# Patient Record
Sex: MALE | Race: WHITE | NOT HISPANIC OR LATINO | URBAN - METROPOLITAN AREA
[De-identification: names, ages, dates, MRNs, and addresses within clinical notes are randomized per-mention and may not be internally consistent; named-entity substitution may affect disease eponyms.]

---

## 2018-06-01 ENCOUNTER — OFFICE VISIT - HEALTHEAST (OUTPATIENT)
Dept: CARDIOLOGY | Facility: CLINIC | Age: 68
End: 2018-06-01

## 2018-06-01 DIAGNOSIS — R00.1 BRADYCARDIA: ICD-10-CM

## 2018-06-01 DIAGNOSIS — R55 VASOVAGAL SYNCOPE: ICD-10-CM

## 2018-06-01 DIAGNOSIS — I95.1 ORTHOSTATIC HYPOTENSION: ICD-10-CM

## 2018-06-01 ASSESSMENT — MIFFLIN-ST. JEOR: SCORE: 1707.93

## 2019-09-30 ENCOUNTER — OFFICE VISIT - HEALTHEAST (OUTPATIENT)
Dept: CARDIOLOGY | Facility: CLINIC | Age: 69
End: 2019-09-30

## 2019-09-30 DIAGNOSIS — I10 ESSENTIAL (PRIMARY) HYPERTENSION: ICD-10-CM

## 2019-09-30 DIAGNOSIS — R06.09 DYSPNEA ON EXERTION: ICD-10-CM

## 2019-09-30 DIAGNOSIS — I49.5 SINUS NODE DYSFUNCTION (H): ICD-10-CM

## 2019-09-30 ASSESSMENT — MIFFLIN-ST. JEOR: SCORE: 1707.93

## 2019-10-14 ENCOUNTER — HOSPITAL ENCOUNTER (OUTPATIENT)
Dept: CARDIOLOGY | Facility: CLINIC | Age: 69
Discharge: HOME | End: 2019-10-14
Attending: INTERNAL MEDICINE

## 2019-10-14 DIAGNOSIS — I49.5 SINUS NODE DYSFUNCTION (H): ICD-10-CM

## 2019-10-14 DIAGNOSIS — R06.09 OTHER FORMS OF DYSPNEA: ICD-10-CM

## 2019-10-14 DIAGNOSIS — R06.09 DYSPNEA ON EXERTION: ICD-10-CM

## 2019-10-14 LAB
CV STRESS CURRENT BP HE: NORMAL
CV STRESS CURRENT HR HE: 100
CV STRESS CURRENT HR HE: 102
CV STRESS CURRENT HR HE: 104
CV STRESS CURRENT HR HE: 107
CV STRESS CURRENT HR HE: 108
CV STRESS CURRENT HR HE: 108
CV STRESS CURRENT HR HE: 109
CV STRESS CURRENT HR HE: 115
CV STRESS CURRENT HR HE: 120
CV STRESS CURRENT HR HE: 122
CV STRESS CURRENT HR HE: 124
CV STRESS CURRENT HR HE: 125
CV STRESS CURRENT HR HE: 129
CV STRESS CURRENT HR HE: 131
CV STRESS CURRENT HR HE: 131
CV STRESS CURRENT HR HE: 53
CV STRESS CURRENT HR HE: 54
CV STRESS CURRENT HR HE: 63
CV STRESS CURRENT HR HE: 69
CV STRESS CURRENT HR HE: 74
CV STRESS CURRENT HR HE: 74
CV STRESS CURRENT HR HE: 75
CV STRESS CURRENT HR HE: 78
CV STRESS CURRENT HR HE: 79
CV STRESS CURRENT HR HE: 80
CV STRESS CURRENT HR HE: 80
CV STRESS CURRENT HR HE: 81
CV STRESS CURRENT HR HE: 82
CV STRESS CURRENT HR HE: 83
CV STRESS CURRENT HR HE: 83
CV STRESS CURRENT HR HE: 93
CV STRESS CURRENT HR HE: 98
CV STRESS DEVIATION TIME HE: NORMAL
CV STRESS ECHO PERCENT HR HE: NORMAL
CV STRESS EXERCISE STAGE HE: NORMAL
CV STRESS FINAL RESTING BP HE: NORMAL
CV STRESS FINAL RESTING HR HE: 78
CV STRESS MAX HR HE: 131
CV STRESS MAX TREADMILL GRADE HE: 18
CV STRESS MAX TREADMILL SPEED HE: 5
CV STRESS PEAK DIA BP HE: NORMAL
CV STRESS PEAK SYS BP HE: NORMAL
CV STRESS PHASE HE: NORMAL
CV STRESS PROTOCOL HE: NORMAL
CV STRESS RESTING PT POSITION HE: NORMAL
CV STRESS RESTING PT POSITION HE: NORMAL
CV STRESS ST DEVIATION AMOUNT HE: NORMAL
CV STRESS ST DEVIATION ELEVATION HE: NORMAL
CV STRESS ST EVELATION AMOUNT HE: NORMAL
CV STRESS TEST TYPE HE: NORMAL
CV STRESS TOTAL STAGE TIME MIN 1 HE: NORMAL
ECHO EJECTION FRACTION ESTIMATED: 65 %
RATE PRESSURE PRODUCT: NORMAL
STRESS ECHO BASELINE BP: NORMAL
STRESS ECHO BASELINE DIASTOLIC HE: 92
STRESS ECHO BASELINE HR: 54
STRESS ECHO BASELINE SYSTOLIC BP: 143
STRESS ECHO CALCULATED PERCENT HR: 86 %
STRESS ECHO LAST STRESS BP: NORMAL
STRESS ECHO LAST STRESS DIASTOLIC BP: 62
STRESS ECHO LAST STRESS HR: 129
STRESS ECHO LAST STRESS SYSTOLIC BP: 184
STRESS ECHO POST ESTIMATED WORKLOAD: 12.3
STRESS ECHO POST EXERCISE DUR MIN: 12
STRESS ECHO POST EXERCISE DUR SEC: 0
STRESS ECHO TARGET HR: 153

## 2021-06-01 ENCOUNTER — RECORDS - HEALTHEAST (OUTPATIENT)
Dept: ADMINISTRATIVE | Facility: CLINIC | Age: 71
End: 2021-06-01

## 2021-06-01 VITALS — HEIGHT: 71 IN | WEIGHT: 203 LBS | BODY MASS INDEX: 28.42 KG/M2

## 2021-06-01 NOTE — PATIENT INSTRUCTIONS - HE
Below is a list of instructions we discussed today in clinic:   1. Schedule a stress test to evaluate your shortness of breath with exertion.  2. If you have sleep apnea then treating it may improve your symptoms.  3. Follow up if the stress test results are abnormal.    You should receive a phone call from this office informing you of test or procedure results within 3 business days of the test being performed.  If you do not hear from our office with the test results within 1 week please do not hesitate to call asking for these results.     It was a pleasure to meet with you today in clinic.  Please do not hesitate to call the [facility] clinic with any questions or concerns at [phone].    Sincerely,     Tim Lerma MD  Non-invasive Cardiology  [facility]

## 2021-06-03 VITALS
DIASTOLIC BLOOD PRESSURE: 76 MMHG | HEIGHT: 71 IN | WEIGHT: 203 LBS | BODY MASS INDEX: 28.42 KG/M2 | HEART RATE: 44 BPM | RESPIRATION RATE: 16 BRPM | SYSTOLIC BLOOD PRESSURE: 138 MMHG

## 2021-06-16 PROBLEM — R55 VASOVAGAL SYNCOPE: Status: ACTIVE | Noted: 2018-06-01

## 2021-06-18 NOTE — PROGRESS NOTES
CARDIOLOGY CLINIC CONSULT NOTE     Assessment/Plan:   1. Vasovagal syncope, aggravated by dehydration from the heat and from diarrhea.  Also aggravated by chronic resting bradycardia.  Maneuvers to prevent syncope and injury were discussed with patient and his wife.  2. Sinus bradycardia, marked.  No definite indications for pacemaker placement.  We will check a 24 hour monitor to assess the degree of bradycardia present and ensure normal response to daily activities.  3. Possible sleep disordered breathing.  Encouraged him to discuss with his primary care provider whether to pursue sleep apnea testing  4. Possible white coat hypertension.  Resting hypotension could contribute to his vulnerability to vasovagal syncope.  Will decrease antihypertensive regimen by eliminating amlodipine, and have him monitor his blood pressures at home for further evaluation    Follow up dependent on results of testing     History of Present Illness:     It is my pleasure to see Kole Aguirre at the [facility] RAPID ACCESS clinic for evaluation of syncopal episode.    Kole Aguirre is a 68 y.o. male with a past medical history of diabetes mellitus type 2, hypertension, hyperlipidemia, obesity, and persistent sinus bradycardia with normal response to activities noted on stress testing 3 years ago.  He is continued to have bradycardia and is known to be somewhat prone to fainting such as with blood draws.  In general he is felt well.  He is on blood pressure medications though he is convinced that his blood pressure is always higher when he goes to the doctor's office.  He has a blood pressure cuff at home but does not check it there.  A bicycle stress echo was done at [facility] 3 months ago.  His heart rate response to exercise was felt to be inadequate, he was given atropine to reach the target heart rate.  No Suggestion of ischemia was noted    Yesterday he was working outside in the heat. he was trying to stay  hydrated but had some GI upset and had 6 or 8 bouts of diarrhea.  He also felt decreased appetite and had poor oral intake.  Several hours later he was at a restaurant with his children and became dizzy diaphoretic nauseated.  He noticed his vision was blurred and he lost consciousness for about 15 seconds.  He had no chest pain or awareness of palpitations.  There was no injury.  His last syncopal episode was 3 years ago.    He is here with his wife today they are most concerned because his son is getting  tomorrow.    Past Medical History:     Patient Active Problem List   Diagnosis     Diabetes     HLD (hyperlipidemia)     Routine general medical examination at a health care facility     Disordered sleep     Essential (primary) hypertension     Hypotension     Bradycardia       Past Surgical History:     Past Surgical History:   Procedure Laterality Date     COLONOSCOPY         Family History:     Family History   Problem Relation Age of Onset     Hypertension Mother      Diabetes Father      Heart attack Brother      Coronary artery disease Brother      Family history reviewed and is not pertinent to the chief complaint or presenting problem    Social History:    reports that he has never smoked. He does not have any smokeless tobacco history on file. He reports that he drinks about 5.0 - 6.0 oz of alcohol per week     Exercise: Active.  Former runner, now mostly doing yard work    Sleep: Generally restorative.  Slow to wake up.  Falls asleep easily during the daytime.  Snores heroically, though no apnea has been noted    Meds:     Current Outpatient Prescriptions on File Prior to Visit   Medication Sig Dispense Refill     amLODIPine (NORVASC) 5 MG tablet Take 5 mg by mouth daily.       aspirin 81 MG EC tablet Take 81 mg by mouth daily.       atorvastatin (LIPITOR) 20 MG tablet Take 20 mg by mouth bedtime.        ibuprofen (ADVIL,MOTRIN) 200 MG tablet Take 400 mg by mouth every 6 (six) hours as needed for  "pain or headaches.       lisinopril (PRINIVIL,ZESTRIL) 10 MG tablet Take 10 mg by mouth daily.       metFORMIN (GLUCOPHAGE-XR) 500 MG 24 hr tablet Take 500 mg by mouth 2 (two) times a day.       Current Facility-Administered Medications on File Prior to Visit   Medication Dose Route Frequency Provider Last Rate Last Dose     [COMPLETED] sodium chloride 0.9% 1,000 mL  1,000 mL Intravenous Once Kristina Chaney MD   Stopped at 05/31/18 6970       Allergies:   Review of patient's allergies indicates no known allergies.    Review of Systems:     General: WNL  Eyes: WNL  Ears/Nose/Throat: WNL  Lungs: Shortness of Breath  Heart: WNL  Stomach: WNL  Bladder: WNL  Muscle/Joints: WNL  Skin: WNL  Nervous System: WNL  Mental Health: WNL     Blood: WNL        Objective:      Physical Exam  203 lb (92.1 kg)  5' 11\" (1.803 m)  Body mass index is 28.31 kg/(m^2).  /60 (Patient Site: Left Arm, Patient Position: Sitting, Cuff Size: Adult Regular)  Pulse (!) 47  Resp 10  Ht 5' 11\" (1.803 m)  Wt 203 lb (92.1 kg)  BMI 28.31 kg/m2    General Appearance : Awake, Alert, No acute distress  HEENT: No Scleral icterus; the mucous membranes were pink and moist.  Conjunctivae injected.  Periorbital puffiness is noted  Neck:  No cervical bruits, jugular venous distention, or thyromegaly    Chest: The spine was straight  Lungs: Respirations unlabored; the lungs are clear to auscultation.  No wheezing    Cardiovascular:    Normal point of maximal impulse.  Auscultation reveals slow first and second heart sounds with no murmurs, rubs, or gallops.  Carotid, radial, and dorsalis pedal pulses and intact.    Abdomen: No organomegaly, masses, bruits, or tenderness. Bowels sounds are present  Extremities: No edema  Skin: No xanthelasma. Warm, Dry.  Musculoskeletal: No tenderness.  Neurologic: Alert and oriented ×3.      EKG(personally reviewed):  5/31/2018: Sinus bradycardia 44 bpm.  Incomplete right bundle branch block.  No change versus " 2015.    Cardiac Imaging Studies:  HOLTER MONITOR REPORT   IMPRESSION:   1. A 24-hour Holter monitor was applied 07/16/2015 with date of analysis/interpretation 07/20/2015.   2. Sinus bradycardia is present. The average heart rate is 51 beats per minute.   Nocturnal rates as low as 33 beats per minute are noted and maximum heart rate achieved is 117.   3. With activity there appears to be an episode of junctional rhythm at 99 beats   per minute. This occurs while walking and was associated with the sensation of  tightness in chest.   4. No paroxysmal bradycardia or high grade heart block.   5. Rare ventricular ectopy, 2 premature ventricular complexes.   6. Rare supraventricular ectopy with 321 premature atrial complexes, rare couplets, but no salvos, no atrial tachycardia.   7. Patient activity diary is completed. No clear-cut relation of the arrhythmia to   symptoms except for tightness in chest at 10:49 a.m. and occurring while walking with a junctional rhythm at 99 beats per minute.   DISCUSSION: Some degree of sinus node dysfunction is present with sinus bradycardia   and some junctional rhythms with activity. No profound bradycardia or long pauses seen.     Stress echocardiogram 2015:  Summary   Prominent resting sinus bradycardia. Resting heart rate 44 bpm   Good exercise tolerance,11 minutes 22 seconds on a Rosendo protocol.   Resting hypertension   Exercise stopped secondary to fatigue. No reported chest discomfort.   Exercise ECG negative for ischemia.   Normal resting LVEF. Resting LVEF estimated at 60%. Post exercise LV function becomes appropriately augmented without wall motion abnormality   suggesting a negative stress echocardiogram   Mildly blunted heart rate response to exercise.  Maximum heart rate achieved is 87% of predicted    Lab Review   Lab Results   Component Value Date     05/31/2018    K 4.7 05/31/2018     05/31/2018    CO2 23 05/31/2018    BUN 23 (H) 05/31/2018    CREATININE  1.72 (H) 05/31/2018    CALCIUM 9.4 05/31/2018     Lab Results   Component Value Date    WBC 6.6 05/31/2018    WBC 11.9 (H) 07/09/2015    HGB 15.0 05/31/2018    HCT 43.8 05/31/2018    MCV 90 05/31/2018     05/31/2018     Lab Results   Component Value Date    CHOL 125 08/31/2009    TRIG 82 08/31/2009    HDL 46 08/31/2009    LDLCALC 63 08/31/2009     Lab Results   Component Value Date    TROPONINI <0.01 05/31/2018     No results found for: BNP  Lab Results   Component Value Date    TSH 1.31 07/10/2015       Devaughn Mercedes MD [facility]    His note created using Dragon voice recognition software. Sound alike errors may have escaped editing.

## 2021-06-27 ENCOUNTER — HEALTH MAINTENANCE LETTER (OUTPATIENT)
Age: 71
End: 2021-06-27

## 2021-06-28 NOTE — PROGRESS NOTES
Progress Notes by Tim Lerma MD at 9/30/2019  7:50 AM     Author: Tim Lerma MD Service: -- Author Type: Physician    Filed: 9/30/2019  8:16 AM Encounter Date: 9/30/2019 Status: Signed    : Tim Lerma MD (Physician)           Click to link to [facility]     [facility] RAPID ACCESS CLINIC NOTE    Thank you, Estella Mcdaniels, CNP, for asking the [facility] team to see Mr. Kole Aguirre to evaluate Shortness of Breath and Hypertension.      Assessment/Recommendations   Assessment:    1. Dyspnea on exertion - persists over many years, he is at moderate risk of coronary artery disease and has not had an ischemic evaluation in 4 years.  2. Sinus node dysfunction - mild based on holter monitor from 2015. No significant change in symptoms and no pre-syncope or syncope.   3. Hypertension - with a history of orthostatic hypotension. BP controlled today, but was elevated during a recent ER visit. Will defer hypertension management to PCP.    Plan:  1. Exercise stress echo to re-evaluate sinus node function and dyspnea on exertion.  2. Discussed that dyspnea on exertion can often improve with treating sleep disordered breathing, if present.   3. Follow up in cardiology pending abnormal stress test results or with worsening symptoms.           History of Present Illness   Mr. Kole Aguirre is a 69 y.o. male with a significant past history of vasovagal syncope, hypertension, DMII, sleep disordered breathing, and some degree of sinus node dysfunction on a holter monitor who presents for evaluation of dyspnea on exertion.    Mr. Aguirre reports that his shortness of breath with exertion has been present for many years.  It is worse in humid weather.  He is not certain if it is stable or if it has mildly worsened over the past 5 years.  It is associate with some mild lightheadedness and some mild chest tightness.  He had an exercise stress echocardiogram  performed in July 2015 on which he exercised for 11 minutes and 22 seconds, had a mildly blunted heart rate response to exercise, and had no evidence of ischemia.  Also in 2015 he had a Holter monitor for orthostatic hypotension that demonstrated mild sinus node dysfunction but no found bradycardia and no pauses.    Other than noted above, Mr. Aguirre denies any chest pain/pressure/tightness, shortness of breath at rest or with exertion, light headedness/dizziness, pre-syncope, syncope, lower extremity swelling, palpitations, paroxysmal nocturnal dyspnea (PND), or orthopnea.     Cardiac Problems and Cardiac Diagnostics     Most Recent Cardiac testing:  ECG dated 9/23/19 (personaly reviewed and interpreted): sinus bradycardia, HR 46 bpm. Otherwise normal.    Holter Monitor 7/16/19  IMPRESSION:  1.  A 24-hour Holter monitor was applied 07/16/2015 with date of analysis/interpretation 07/20/2015.  2.  Sinus bradycardia is present.  The average heart rate is 51 beats per minute. Nocturnal rates as low as 33 beats per minute are noted and maximum heart rateachieved is 117.  3.  With activity there appears to be an episode of junctional rhythm at 99 beats per minute.  This occurs while walking and was associated with the sensation of tightness in chest.  4.  No paroxysmal bradycardia or high grade heart block.  5.  Rare ventricular ectopy, 2 premature ventricular complexes.  6.  Rare supraventricular ectopy with 321 premature atrial complexes, rare couplets, but no salvos, no atrial tachycardia.  7.  Patient activity diary is completed.  No clear-cut relation of the arrhythmia to symptoms except for tightness in chest at 10:49 a.m. and occurring while walking with a junctional rhythm at 99 beats per minute.     DISCUSSION:  Some degree of sinus node dysfunction is present with sinus bradycardia and some junctional rhythms with activity.  No profound bradycardia or long pauses seen.       ECHO (report reviewed):   Echo  results:   Results for orders placed during the hospital encounter of 07/16/15   Echo Stress Exercise [ECH07] 07/16/2015    Narrative  Summary   Prominent resting sinus bradycardia. Resting heart rate 44 bpm   Good exercise tolerance   Resting hypertension   Exercise stopped secondary to fatigue. No reported chest discomfort.   Exercise ECG negative for ischemia.   Normal resting LVEF. Resting LVEF estimated at 60%. Post exercise LV   function becomes appropriately augmented without wall motion abnormality   suggesting a negative stress echocardiogram   Mildly blunted heart rate response to exercise.Maximum heart rate achieved   is 87% of predicted             Medications  Allergies   Current Outpatient Medications   Medication Sig Dispense Refill   ? amLODIPine (NORVASC) 5 MG tablet Take 5 mg by mouth daily.     ? aspirin 81 MG EC tablet Take 81 mg by mouth daily.     ? atorvastatin (LIPITOR) 20 MG tablet Take 20 mg by mouth bedtime.      ? metFORMIN (GLUCOPHAGE-XR) 500 MG 24 hr tablet Take 500 mg by mouth 2 (two) times a day.     ? ibuprofen (ADVIL,MOTRIN) 200 MG tablet Take 400 mg by mouth every 6 (six) hours as needed for pain or headaches.     ? lisinopril (PRINIVIL,ZESTRIL) 10 MG tablet Take 10 mg by mouth daily.       No current facility-administered medications for this visit.       No Known Allergies     Physical Examination Review of Systems   Vitals:    09/30/19 0742   BP: 138/76   Pulse: (!) 44   Resp: 16     Body mass index is 28.31 kg/m .  Wt Readings from Last 3 Encounters:   09/30/19 203 lb (92.1 kg)   09/23/19 200 lb (90.7 kg)   06/01/18 203 lb (92.1 kg)       General Appearance:   Pleasant male, appears stated age. no acute distress, mildly overweight body habitus   ENT/Mouth: membranes moist, no apparent gingival bleeding.      EYES:  no scleral icterus, normal conjunctivae   Neck: supple   Respiratory:   lungs are clear to auscultation, no rales or wheezing, equal chest wall expansion     Cardiovascular:   Regular rhythm, normal rate. Normal first and second heart sounds with no murmurs, rubs, or gallops; the carotid, radial and posterior tibial pulses are intact, Jugular venous pressure normal, no edema bilaterally    Abdomen/GI:  Soft, non-tender   Extremities: no cyanosis or clubbing   Skin: no xanthelasma, warm.    Heme/lymph/ Immunology No apparent bleeding noted.   Neurologic: Alert and oriented. normal gait, no tremors     Psychiatric: Pleasant, calm, appropriate affect.    A complete 10 system review of systems was performed and is negative except as mentioned in the HPI or below:  General: WNL  Eyes: WNL  Ears/Nose/Throat: WNL  Lungs: WNL  Heart: WNL  Stomach: WNL  Bladder: WNL  Muscle/Joints: WNL  Skin: WNL  Nervous System: Dizziness  Mental Health: WNL     Blood: WNL       Past History   Past Medical History:   Past Medical History:   Diagnosis Date   ? Diabetes mellitus (H)    ? HTN (hypertension)    ? Hypercholesterolemia    ? Near syncope        Past Surgical History:   Past Surgical History:   Procedure Laterality Date   ? COLONOSCOPY         Family History:   Family History   Problem Relation Age of Onset   ? Hypertension Mother    ? Diabetes Father    ? Heart attack Brother    ? Coronary artery disease Brother         Social History:   Social History     Socioeconomic History   ? Marital status:      Spouse name: Not on file   ? Number of children: Not on file   ? Years of education: Not on file   ? Highest education level: Not on file   Occupational History   ? Not on file   Social Needs   ? Financial resource strain: Not on file   ? Food insecurity:     Worry: Not on file     Inability: Not on file   ? Transportation needs:     Medical: Not on file     Non-medical: Not on file   Tobacco Use   ? Smoking status: Never Smoker   ? Smokeless tobacco: Never Used   Substance and Sexual Activity   ? Alcohol use: Yes     Alcohol/week: 8.3 - 10.0 standard drinks     Types: 10 - 12  Standard drinks or equivalent per week   ? Drug use: Never   ? Sexual activity: Yes     Partners: Female   Lifestyle   ? Physical activity:     Days per week: Not on file     Minutes per session: Not on file   ? Stress: Not on file   Relationships   ? Social connections:     Talks on phone: Not on file     Gets together: Not on file     Attends Zoroastrian service: Not on file     Active member of club or organization: Not on file     Attends meetings of clubs or organizations: Not on file     Relationship status: Not on file   ? Intimate partner violence:     Fear of current or ex partner: Not on file     Emotionally abused: Not on file     Physically abused: Not on file     Forced sexual activity: Not on file   Other Topics Concern   ? Not on file   Social History Narrative   ? Not on file              Lab Results    Chemistry/lipid CBC Cardiac Enzymes/BNP/TSH/INR   Lab Results   Component Value Date    CHOL 125 08/31/2009    HDL 46 08/31/2009    LDLCALC 63 08/31/2009    TRIG 82 08/31/2009    CREATININE 0.87 09/23/2019    BUN 15 09/23/2019    K 4.1 09/23/2019     09/23/2019     09/23/2019    CO2 26 09/23/2019    Lab Results   Component Value Date    WBC 3.4 (L) 09/23/2019    HGB 13.2 (L) 09/23/2019    HCT 38.9 (L) 09/23/2019    MCV 92 09/23/2019     (L) 09/23/2019    Lab Results   Component Value Date    CKMB 4 08/30/2009    TROPONINI <0.01 09/23/2019    BNP 52 09/23/2019    TSH 1.31 07/10/2015          Tim Lerma MD  Non-invasive Cardiology  Count includes the [facility]

## 2021-10-17 ENCOUNTER — HEALTH MAINTENANCE LETTER (OUTPATIENT)
Age: 71
End: 2021-10-17

## 2022-02-06 ENCOUNTER — HEALTH MAINTENANCE LETTER (OUTPATIENT)
Age: 72
End: 2022-02-06

## 2022-05-29 ENCOUNTER — HEALTH MAINTENANCE LETTER (OUTPATIENT)
Age: 72
End: 2022-05-29

## 2022-07-24 ENCOUNTER — HEALTH MAINTENANCE LETTER (OUTPATIENT)
Age: 72
End: 2022-07-24

## 2022-10-02 ENCOUNTER — HEALTH MAINTENANCE LETTER (OUTPATIENT)
Age: 72
End: 2022-10-02

## 2023-02-11 ENCOUNTER — HEALTH MAINTENANCE LETTER (OUTPATIENT)
Age: 73
End: 2023-02-11

## 2023-05-20 ENCOUNTER — HEALTH MAINTENANCE LETTER (OUTPATIENT)
Age: 73
End: 2023-05-20

## 2023-08-12 ENCOUNTER — HEALTH MAINTENANCE LETTER (OUTPATIENT)
Age: 73
End: 2023-08-12

## 2023-10-21 ENCOUNTER — HEALTH MAINTENANCE LETTER (OUTPATIENT)
Age: 73
End: 2023-10-21

## 2024-03-09 ENCOUNTER — HEALTH MAINTENANCE LETTER (OUTPATIENT)
Age: 74
End: 2024-03-09